# Patient Record
Sex: MALE | Race: OTHER | NOT HISPANIC OR LATINO | URBAN - METROPOLITAN AREA
[De-identification: names, ages, dates, MRNs, and addresses within clinical notes are randomized per-mention and may not be internally consistent; named-entity substitution may affect disease eponyms.]

---

## 2018-01-20 ENCOUNTER — EMERGENCY (EMERGENCY)
Facility: HOSPITAL | Age: 48
LOS: 1 days | Discharge: HOME | End: 2018-01-20
Attending: EMERGENCY MEDICINE | Admitting: EMERGENCY MEDICINE
Payer: COMMERCIAL

## 2018-01-20 VITALS
TEMPERATURE: 98 F | DIASTOLIC BLOOD PRESSURE: 93 MMHG | HEART RATE: 98 BPM | WEIGHT: 164.91 LBS | SYSTOLIC BLOOD PRESSURE: 160 MMHG | HEIGHT: 61 IN | OXYGEN SATURATION: 98 % | RESPIRATION RATE: 18 BRPM

## 2018-01-20 DIAGNOSIS — H11.32 CONJUNCTIVAL HEMORRHAGE, LEFT EYE: ICD-10-CM

## 2018-01-20 DIAGNOSIS — H57.12 OCULAR PAIN, LEFT EYE: ICD-10-CM

## 2018-01-20 PROCEDURE — 99283 EMERGENCY DEPT VISIT LOW MDM: CPT

## 2018-01-20 PROCEDURE — 99284 EMERGENCY DEPT VISIT MOD MDM: CPT

## 2018-01-20 RX ORDER — ERYTHROMYCIN BASE 5 MG/GRAM
1 OINTMENT (GRAM) OPHTHALMIC (EYE) ONCE
Qty: 0 | Refills: 0 | Status: COMPLETED | OUTPATIENT
Start: 2018-01-20 | End: 2018-01-20

## 2018-01-20 RX ADMIN — Medication 1 DROP(S): at 19:58

## 2018-01-20 RX ADMIN — Medication 1 APPLICATION(S): at 19:59

## 2018-01-20 NOTE — ED PROVIDER NOTE - MEDICAL DECISION MAKING DETAILS
here w/ painful red eye. +subconjunctival hemorrhage. no FB found. difficult to tell if abrasion present, will treat like there is. d/w on call ophthalmologist, plan for artificial tears, erythromycin ointment, and pts cell phone and information given to her, she will arrange for pt to get expedited outpatient ophtho eval.

## 2018-01-20 NOTE — ED PROVIDER NOTE - OBJECTIVE STATEMENT
46yo M here w/ L eye pain. States felt like a piece of gravel flew into his eye, started rubbing it, and pain increased. looked in mirror and saw whole eye red, so got concerned, and presented to urgent care. at urgent care felt needed higher level of care so discharged w/ plan for him to present to the ED. no interventions or testing done there. Pt denies R eye pain. No increased pain w/ EOM. Denies blurry vision, floaters, flashers.

## 2018-01-20 NOTE — ED PROVIDER NOTE - PHYSICAL EXAMINATION
CONSTITUTIONAL: Well-appearing; well-nourished; in no apparent distress.   HEAD: Normocephalic; atraumatic.   EYES:  R eye normal appearing. L eye w/ large lateral subconjunctival hemorrhage, slight fluorescein uptake on medial side. Vision 20/50 in each eye.   ENT: normal nose; no rhinorrhea; normal pharynx with no erythema or lesions.   NECK: Supple; non-tender;   CARDIOVASCULAR: Normal S1, S2; no murmurs, rubs, or gallops. Regular rate and rhythm.   RESPIRATORY: Breathing easily; breath sounds clear and equal bilaterally; no wheezes, rhonchi, or rales.  GI: Soft; non-distended; non-tender; no palpable organomegaly.   EXT: No cyanosis or edema; N/V intact  SKIN: Normal for age and race; warm; dry; good turgor; no apparent lesions or rash.   NEURO: A & O x 3; face symmetric; grossly unremarkable.   PSYCHOLOGICAL: The patient’s mood and manner are appropriate.

## 2018-01-20 NOTE — ED ADULT TRIAGE NOTE - CHIEF COMPLAINT QUOTE
Patient c/o left eye pain , redness with blood and blurry vision for 4 days . Was sent from urgent care for evaluation .

## 2019-12-11 NOTE — ED ADULT NURSE NOTE - OBJECTIVE STATEMENT
Electrodesiccation And Curettage Text: The wound bed was treated with electrodesiccation and curettage after the biopsy was performed. Hide Biopsy Depth?: No Consent: Written consent was obtained and risks were reviewed including but not limited to scarring, infection, bleeding, scabbing, incomplete removal, nerve damage and allergy to anesthesia. Type Of Destruction Used: Curettage X Size Of Lesion In Cm: 0 Path Notes (To The Dermatopathologist): Check margins Biopsy Type: H and E Hemostasis: Electrocautery Silver Nitrate Text: The wound bed was treated with silver nitrate after the biopsy was performed. Anticipated Plan (Based On Presumed Biopsy Results): EDC Anesthesia Volume In Cc (Will Not Render If 0): 0.6 c/o left eye pain with subconjunctival hemorrhage that started ~ 4 days ago quotes "there was a gravel and when I took it out, it scraped it." Pt states he still feels something scraping his eye accompanied with blurred vision x 4 days. Was A Bandage Applied: Yes Dressing: bandage Anesthesia Type: 1% lidocaine with epinephrine Curettage Text: The wound bed was treated with curettage after the biopsy was performed. Billing Type: Third-Party Bill Lab: [id number] Detail Level: Detailed Biopsy Method: Dermablade Depth Of Biopsy: dermis Wound Care: Mupirocin Notification Instructions: Patient will be notified of biopsy results. However, patient instructed to call the office if not contacted within 2 weeks. Electrodesiccation Text: The wound bed was treated with electrodesiccation after the biopsy was performed. Post-Care Instructions: I reviewed with the patient in detail post-care instructions. Patient is to keep the biopsy site dry overnight, and then apply bacitracin twice daily until healed. Patient may apply hydrogen peroxide soaks to remove any crusting. Cryotherapy Text: The wound bed was treated with cryotherapy after the biopsy was performed. Lab Facility: [id number]

## 2023-10-16 NOTE — ED PROVIDER NOTE - NSTIMEPROVIDERCAREINITIATE_GEN_ER
VM left with pre-colonoscopy instructions:     Date/time/location of procedure     NPO after MN status     Need for       Need to complete bowel prep/instructions per Dr. Pradip Corrales pt to take thyroid meds with a small sip of water prior to procedure. Located within [facility] phone number [phone] provided for pt to call with any further questions prior to procedure.
20-Jan-2018 18:34